# Patient Record
Sex: FEMALE | Race: WHITE | NOT HISPANIC OR LATINO | URBAN - METROPOLITAN AREA
[De-identification: names, ages, dates, MRNs, and addresses within clinical notes are randomized per-mention and may not be internally consistent; named-entity substitution may affect disease eponyms.]

---

## 2023-05-25 DIAGNOSIS — R19.7 DIARRHEA, UNSPECIFIED TYPE: Primary | ICD-10-CM

## 2023-06-02 ENCOUNTER — TELEMEDICINE (OUTPATIENT)
Dept: PRIMARY CARE | Facility: CLINIC | Age: 23
End: 2023-06-02
Payer: COMMERCIAL

## 2023-06-02 ENCOUNTER — APPOINTMENT (OUTPATIENT)
Dept: PRIMARY CARE | Facility: CLINIC | Age: 23
End: 2023-06-02
Payer: COMMERCIAL

## 2023-06-02 DIAGNOSIS — K58.2 IRRITABLE BOWEL SYNDROME WITH BOTH CONSTIPATION AND DIARRHEA: Primary | ICD-10-CM

## 2023-06-02 PROCEDURE — 99213 OFFICE O/P EST LOW 20 MIN: CPT | Performed by: FAMILY MEDICINE

## 2023-06-02 RX ORDER — TRIFAROTENE 50 UG/G
CREAM TOPICAL
COMMUNITY
Start: 2022-08-16 | End: 2023-12-04 | Stop reason: WASHOUT

## 2023-06-02 RX ORDER — TAZAROTENE 0.45 MG/G
LOTION TOPICAL
COMMUNITY
End: 2023-12-04 | Stop reason: WASHOUT

## 2023-06-02 RX ORDER — SULFAMETHOXAZOLE AND TRIMETHOPRIM 800; 160 MG/1; MG/1
1 TABLET ORAL 2 TIMES DAILY
COMMUNITY
Start: 2023-04-10

## 2023-06-02 NOTE — PROGRESS NOTES
Subjective   Patient ID: Marivel Alvarez is a 22 y.o. female who presents for Irritable Bowel Syndrome.    HPI  Marivel has been experiencing abdominal discomfort daily after eating or drinking.  She has not been able to determine a particular trigger to this point.  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    Sometimes the pain has been so intense in the epigastric region that she has not been able to eat.  Initially, she thought about lactose intolerance, but there is some times that she feels fine with eating ice cream, and then sometimes she has diarrhea.  Some of her bowel movements have been alternating between constipation and diarrhea.  Stool has been mucousy, but no black or blood noted in the stool or on the toilet paper with the exception of a time in which she was constipated, telling me that she thinks she may have had a hemorrhoid or at least a fissure, and it lasted for quite a while throughout a given day, but not to return.    In addition to the aforementioned, she had noticed a rash that started on her elbows, and a rash on the webbing's of her fingers, some vesicular, and since going gluten-free, the rash has started to dissipate on her elbow, and the rash is gone between her fingers.    Review of Systems  All pertinent positive symptoms are included in the history of present illness.    All other systems have been reviewed and are negative and noncontributory to this patient's current ailments.    Allergies   Allergen Reactions    Minocycline Headache       Immunization History   Administered Date(s) Administered    DTaP 1999, 01/21/2000, 03/17/2000, 01/08/2001, 06/01/2004, 05/27/2021    HPV, Quadrivalent 07/06/2011, 10/11/2011, 07/27/2012    Hep A, ped/adol, 2 dose 05/23/2017, 12/18/2017    Hep B, adult 01/21/2000, 03/17/2000,  09/18/2000    Hib (HbOC) 1999, 01/21/2000, 03/17/2000, 01/08/2001    IPV 1999, 01/21/2000, 03/21/2001, 06/01/2004    Influenza, injectable, MDCK, preservative free, quadrivalent 09/22/2022    Influenza, injectable, quadrivalent 12/18/2017    Influenza, injectable, quadrivalent, preservative free 10/01/2021    MMR 01/08/2001, 06/01/2004    Meningococcal B, Omv 12/18/2017, 03/15/2018    Meningococcal B, Unspecified 12/18/2017, 03/15/2018    Meningococcal MCV4P 07/06/2011, 07/05/2016    Moderna SARS-CoV-2 Vaccination 12/20/2021    Pfizer Purple Cap SARS-CoV-2 04/01/2021, 04/22/2021    Pneumococcal Conjugate PCV 7 01/08/2001, 03/21/2001    Tdap 07/06/2011, 05/27/2021    Varicella 09/18/2000, 07/15/2014       Objective   Visit Vitals  Smoking Status Never       Physical Exam  CONSTITUTIONAL - well nourished, well developed, looks like stated age, in no acute distress, not ill-appearing, and not tired appearing  SKIN - normal skin color and pigmentation; barely visible rash on the right elbow, no rash noted between the fingers  EYES - normal external exam  LUNGS - breathing comfortably, no dyspnea  EXTREMITIES - no deformities noticeable  NEUROLOGICAL - oriented and no focal signs  PSYCHIATRIC - alert, pleasant and cordial, age-appropriate, not anxious or depressed appearing    Assessment/Plan   Problem List Items Addressed This Visit       Irritable bowel syndrome with both constipation and diarrhea - Primary     Highly suspect IBS, the question that needs to be answered is what is triggering this?    I suspect you likely have celiac disease even though your celiac test that was recently done is negative as 10% of the population can be seronegative and still have celiac disease    I would recommend continuing gluten-free diet for at least another 5 days, and if your symptoms resolve after 10 days of being gluten-free, that is your answer    If there is no improvement, and you continue to have IBS symptoms  including mucousy stool, bowel cramping, bloating, then an upper and lower endoscopy are warranted, so please let me know and I can get you seen by one of the GI docs in our healthcare system

## 2023-06-02 NOTE — ASSESSMENT & PLAN NOTE
Highly suspect IBS, the question that needs to be answered is what is triggering this?    I suspect you likely have celiac disease even though your celiac test that was recently done is negative as 10% of the population can be seronegative and still have celiac disease    I would recommend continuing gluten-free diet for at least another 5 days, and if your symptoms resolve after 10 days of being gluten-free, that is your answer    If there is no improvement, and you continue to have IBS symptoms including mucousy stool, bowel cramping, bloating, then an upper and lower endoscopy are warranted, so please let me know and I can get you seen by one of the GI docs in our healthcare system   Yes

## 2023-12-04 ENCOUNTER — TELEMEDICINE (OUTPATIENT)
Dept: PRIMARY CARE | Facility: CLINIC | Age: 23
End: 2023-12-04
Payer: COMMERCIAL

## 2023-12-04 DIAGNOSIS — M25.561 LATERAL KNEE PAIN, RIGHT: Primary | ICD-10-CM

## 2023-12-04 PROCEDURE — 99213 OFFICE O/P EST LOW 20 MIN: CPT | Performed by: FAMILY MEDICINE

## 2023-12-04 NOTE — PROGRESS NOTES
Subjective   Patient ID: Marivel Alvarez is a 23 y.o. female who presents for Knee Pain.    Past Medical, Surgical, and Family History reviewed and updated in chart.    Reviewed all medications by prescribing practitioner or clinical pharmacist (such as prescriptions, OTCs, herbal therapies and supplements) and documented in the medical record.    HPI  Marivel presents virtually for right lateral knee pain.  She ran in a half marathon in April and did fine with this.  She took about 2 to 3 weeks off and slowly progressed back into running.  This was the point she first noticed right lateral knee pain.      Since then, she has been limited to only 1 to 2 miles of running per day before she starts to feel this pain.  It is not present while walking, only until she gets past a mile of running.  It has been so bad recently that she has had to limp back to her apartment after running.    Denies numbness, tingling or radiating pain.    Review of Systems  All pertinent positive symptoms are included in the history of present illness.    All other systems have been reviewed and are negative and noncontributory to this patient's current ailments.    Past Medical History:   Diagnosis Date    Personal history of other infectious and parasitic diseases     History of infection due to human papilloma virus (HPV)    Personal history of urinary (tract) infections     History of chronic urinary tract infection     Past Surgical History:   Procedure Laterality Date    OTHER SURGICAL HISTORY  03/11/2020    [place] tooth extraction     Social History     Tobacco Use    Smoking status: Never     Passive exposure: Never    Smokeless tobacco: Never   Substance Use Topics    Alcohol use: Yes    Drug use: Never     No family history on file.  Immunization History   Administered Date(s) Administered    DTaP vaccine, pediatric  (INFANRIX) 1999, 01/21/2000, 03/17/2000, 01/08/2001, 06/01/2004, 05/27/2021    Flu vaccine (IIV4), preservative  free *Check age/dose* 10/01/2021    HPV, Quadrivalent 07/06/2011, 10/11/2011, 07/27/2012    Hepatitis A vaccine, pediatric/adolescent (HAVRIX, VAQTA) 05/23/2017, 12/18/2017    Hepatitis B vaccine, adult (RECOMBIVAX, ENGERIX) 01/21/2000, 03/17/2000, 09/18/2000    Hib (HbOC) 1999, 01/21/2000, 03/17/2000, 01/08/2001    Influenza, injectable, MDCK, preservative free, quadrivalent 09/22/2022    Influenza, injectable, quadrivalent 12/18/2017    MMR vaccine, subcutaneous (MMR II) 01/08/2001, 06/01/2004    Meningococcal B vaccine (BEXSERO) 12/18/2017, 03/15/2018    Meningococcal B, Unspecified 12/18/2017, 03/15/2018    Meningococcal MCV4P 07/06/2011, 07/05/2016    Moderna SARS-CoV-2 Vaccination 12/20/2021    Pfizer Purple Cap SARS-CoV-2 04/01/2021, 04/22/2021    Pneumococcal Conjugate PCV 7 01/08/2001, 03/21/2001    Poliovirus vaccine, subcutaneous (IPOL) 1999, 01/21/2000, 03/21/2001, 06/01/2004    Tdap vaccine, age 7 year and older (BOOSTRIX) 07/06/2011, 05/27/2021    Varicella vaccine, subcutaneous (VARIVAX) 09/18/2000, 07/15/2014     Current Outpatient Medications   Medication Instructions    levonorgestreL (KYLEENA) 17.5 mcg/24 hrs (5 yrs) 19.5 mg intrauterine device 1 each, intrauterine    sulfamethoxazole-trimethoprim (Bactrim DS) 800-160 mg tablet 1 tablet, oral, 2 times daily     Allergies   Allergen Reactions    Minocycline Headache       Objective   There were no vitals filed for this visit.   BP Readings from Last 3 Encounters:   08/10/22 122/62   05/17/22 114/60   08/05/21 100/60      Wt Readings from Last 3 Encounters:   08/10/22 61.2 kg (135 lb)   05/17/22 57.2 kg (126 lb)   08/05/21 55.8 kg (123 lb)      Physical Exam  CONSTITUTIONAL - well nourished, well developed, looks like stated age, in no acute distress, not ill-appearing, and not tired appearing  SKIN - normal skin color and pigmentation, normal skin turgor without rash, lesions, or nodules visualized  HEAD - no trauma, normocephalic  EYES  - normal external exam  CHEST -no distressed breathing, good effort  EXTREMITIES - no edema, no deformities  NEUROLOGICAL - normal balance, normal motor, no ataxia  PSYCHIATRIC - alert, pleasant and cordial, age-appropriate     Assessment/Plan   Problem List Items Addressed This Visit       Lateral knee pain, right - Primary     Not being able to perform a physical exam today made it challenging to appropriately assess you.  I first and foremost want to rule out a stress fracture as this can be common with long distance runners.  Another diagnosis we did not talk much about today is called IT band syndrome.  The IT band attaches near the lateral knee and can be a common cause of pain with long distance runners as well.      Because you are currently in [place], I placed a physical therapy order for them to assess your running mechanics and address any possible IT band syndrome if present.  You may want to consider some home IT band stretches to see if this alleviates your pain prior to going to physical therapy.  We will contact you with results of your x-ray.    If there is no improvement after we get that x-ray/physical therapy accomplished, we may have to consider an MRI.         Relevant Orders    XR knee right 4+ views    Referral to Physical Therapy

## 2023-12-04 NOTE — ASSESSMENT & PLAN NOTE
Not being able to perform a physical exam today made it challenging to appropriately assess you.  I first and foremost want to rule out a stress fracture as this can be common with long distance runners.  Another diagnosis we did not talk much about today is called IT band syndrome.  The IT band attaches near the lateral knee and can be a common cause of pain with long distance runners as well.      Because you are currently in [place], I placed a physical therapy order for them to assess your running mechanics and address any possible IT band syndrome if present.  You may want to consider some home IT band stretches to see if this alleviates your pain prior to going to physical therapy.  We will contact you with results of your x-ray.    If there is no improvement after we get that x-ray/physical therapy accomplished, we may have to consider an MRI.

## 2024-03-07 DIAGNOSIS — Z11.1 TUBERCULOSIS SCREENING: Primary | ICD-10-CM

## 2024-03-07 DIAGNOSIS — Z02.1 DRUG SCREENING, PRE-EMPLOYMENT: ICD-10-CM

## 2024-03-08 DIAGNOSIS — Z02.1 DRUG SCREENING, PRE-EMPLOYMENT: Primary | ICD-10-CM

## 2024-03-29 ENCOUNTER — APPOINTMENT (OUTPATIENT)
Dept: PRIMARY CARE | Facility: CLINIC | Age: 24
End: 2024-03-29
Payer: COMMERCIAL

## 2024-04-21 DIAGNOSIS — Z11.1 SCREENING-PULMONARY TB: Primary | ICD-10-CM

## 2025-03-18 ASSESSMENT — ENCOUNTER SYMPTOMS
TINGLING: 0
MUSCLE WEAKNESS: 0
LOSS OF SENSATION: 0
LOSS OF MOTION: 0
INABILITY TO BEAR WEIGHT: 1

## 2025-03-19 ENCOUNTER — OFFICE VISIT (OUTPATIENT)
Dept: PRIMARY CARE | Facility: CLINIC | Age: 25
End: 2025-03-19
Payer: COMMERCIAL

## 2025-03-19 VITALS
WEIGHT: 127 LBS | DIASTOLIC BLOOD PRESSURE: 70 MMHG | HEART RATE: 68 BPM | HEIGHT: 65 IN | SYSTOLIC BLOOD PRESSURE: 118 MMHG | BODY MASS INDEX: 21.16 KG/M2

## 2025-03-19 DIAGNOSIS — G43.109 MIGRAINE WITH AURA AND WITHOUT STATUS MIGRAINOSUS, NOT INTRACTABLE: ICD-10-CM

## 2025-03-19 DIAGNOSIS — G89.29 CHRONIC PAIN OF RIGHT KNEE: ICD-10-CM

## 2025-03-19 DIAGNOSIS — Z79.899 ENCOUNTER FOR LONG-TERM (CURRENT) USE OF MEDICATIONS: Primary | ICD-10-CM

## 2025-03-19 DIAGNOSIS — M25.561 CHRONIC PAIN OF RIGHT KNEE: ICD-10-CM

## 2025-03-19 DIAGNOSIS — S83.511A NEW ACL TEAR, RIGHT, INITIAL ENCOUNTER: ICD-10-CM

## 2025-03-19 PROCEDURE — 1036F TOBACCO NON-USER: CPT | Performed by: FAMILY MEDICINE

## 2025-03-19 PROCEDURE — 3008F BODY MASS INDEX DOCD: CPT | Performed by: FAMILY MEDICINE

## 2025-03-19 PROCEDURE — 99214 OFFICE O/P EST MOD 30 MIN: CPT | Performed by: FAMILY MEDICINE

## 2025-03-19 RX ORDER — RIZATRIPTAN BENZOATE 10 MG/1
10 TABLET, ORALLY DISINTEGRATING ORAL ONCE AS NEEDED
Qty: 9 TABLET | Refills: 5 | Status: SHIPPED | OUTPATIENT
Start: 2025-03-19 | End: 2025-09-15

## 2025-03-19 ASSESSMENT — PATIENT HEALTH QUESTIONNAIRE - PHQ9
1. LITTLE INTEREST OR PLEASURE IN DOING THINGS: NOT AT ALL
SUM OF ALL RESPONSES TO PHQ9 QUESTIONS 1 AND 2: 0
2. FEELING DOWN, DEPRESSED OR HOPELESS: NOT AT ALL

## 2025-03-19 NOTE — ASSESSMENT & PLAN NOTE
MRI report reviewed  Please obtain copy of disc for orthopedic surgery  Consultation with orthopedic surgery to discuss probable need for surgical intervention especially since you continue to have long-term pain  I will reach out to Dr. 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

## 2025-03-19 NOTE — ASSESSMENT & PLAN NOTE
Since you have difficulty swallowing pills, I suggested the use of a triptan that disintegrates on the tongue for abortive therapy  Please use as instructed, namely at the onset of headache, and no more than 1 more tablet 2 hours later in a 24-hour period  If you continue to have higher frequency or worsening headache symptoms, we really need to consider a scan of the brain to further evaluate for potential underlying etiologies

## 2025-03-19 NOTE — Clinical Note
Goyo Millan.  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  She was hoping for a consultation within the next 3 weeks as she has a tear of her right ACL.  She will bring a copy of the disc as she had it at an outside facility, and I will scan in the report to the chart.  Any help would be greatly appreciated.

## 2025-03-19 NOTE — PROGRESS NOTES
Chief Complaint  Patient ID: Marivel Alvarez is a 24 y.o. female who presents for Knee Pain and Migraine.    Past Medical, Surgical, and Family History reviewed and updated in chart.    Reviewed all medications by prescribing practitioner or clinical pharmacist (such as prescriptions, OTCs, herbal therapies and supplements) and documented in the medical record.    History of Present Illness  1. Right knee pain.  Marivel was seen on December 4, 2023, for right knee pain via telemedicine. At that time, because we could not perform a physical examination, she was diagnosed with knee pain/strain. She was referred to physical therapy; however, due to the cost and her schedule as a medical student, she only attended four sessions. For the past eight months, she has been routinely performing home physical therapy exercises. She denies any known injury or trauma but recalls that after running marathons, she began experiencing pain. The therapy provided minimal benefit; she reports that she used to be able to run several miles, then three miles, then one mile, and now experiences intense pain following a one-mile run.    An MRI performed at an outside facility on March 14, 2025, revealed a right-sided interstitial ACL tear and mild nonspecific edema in the proximal medial and lateral heads of the gastrocnemius muscle, with no appreciable tear.    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 She recently learned that she has matched and will find out her placement in two days. She plans to get  and travel to [place] before starting her residency. She hopes to see an orthopedic surgeon within the next three weeks for a consultation to discuss potential surgical intervention, as the pain is interfering with her ability to exercise and perform physical activities without discomfort.    2. Migraine  headaches.  Marivel reports experiencing migraine headaches since childhood. Over the past several years, particularly since starting medical school, the frequency of her migraines has increased. She manages them with over-the-counter Excedrin and Tylenol, up to 3000 mg daily. She has never received a prescription medication for either abortive or preventative therapy. She experiences headache symptoms about 3-4 times per month, without any identifiable triggers, such as food, liquids, or her menstrual cycle.    3. Long-term use of medication.  She is currently taking oral contraceptive pills (OCP) and Bactrim to manage acne, which she finds to be very effective. She has been taking these medications for the past three years without any formal blood work but is interested in having this done.    Review of Systems  All pertinent positive symptoms are included in the history of present illness.    All other systems have been reviewed and are negative and noncontributory to this patient's current ailments.    Past Medical History  She has a past medical history of Personal history of other infectious and parasitic diseases and Personal history of urinary (tract) infections.    Surgical History  She has a past surgical history that includes Other surgical history (03/11/2020).     Social History  She reports that she has never smoked. She has never been exposed to tobacco smoke. She has never used smokeless tobacco. She reports current alcohol use. She reports that she does not use drugs.    No family history on file.  Outpatient Medications Prior to Visit   Medication Sig Dispense Refill    levonorgestreL (KYLEENA) 17.5 mcg/24 hrs (5 yrs) 19.5 mg intrauterine device 19.5 mg by intrauterine route.      sulfamethoxazole-trimethoprim (Bactrim DS) 800-160 mg tablet Take 1 tablet by mouth 2 times a day.       No facility-administered medications prior to visit.     Allergies  Minocycline    Immunization History   Administered  "Date(s) Administered    COVID-19, mRNA, LNP-S, PF, 30 mcg/0.3 mL dose 04/01/2021, 04/22/2021    DTaP vaccine, pediatric  (INFANRIX) 1999, 01/21/2000, 03/17/2000, 01/08/2001, 06/01/2004, 05/27/2021    Flu vaccine (IIV4), preservative free *Check age/dose* 10/01/2021    Flu vaccine, quadrivalent, no egg protein, age 6 month or greater (FLUCELVAX) 09/22/2022    Flu vaccine, trivalent, preservative free, age 6 months and greater (Fluarix/Fluzone/Flulaval) 10/09/2024    HPV, Quadrivalent 07/06/2011, 10/11/2011, 07/27/2012    Hepatitis A vaccine, pediatric/adolescent (HAVRIX, VAQTA) 05/23/2017, 12/18/2017    Hepatitis B vaccine, adult *Check Product/Dose* 01/21/2000, 03/17/2000, 09/18/2000    Hib (HbOC) 1999, 01/21/2000, 03/17/2000, 01/08/2001    Influenza, injectable, quadrivalent 12/18/2017    MMR vaccine, subcutaneous (MMR II) 01/08/2001, 06/01/2004    Meningococcal ACWY-D (Menactra) 4-valent conjugate vaccine 07/06/2011, 07/05/2016    Meningococcal B vaccine (BEXSERO) 12/18/2017, 03/15/2018    Meningococcal B, Unspecified 12/18/2017, 03/15/2018    Moderna SARS-CoV-2 Vaccination 12/20/2021    Pneumococcal Conjugate PCV 7 01/08/2001, 03/21/2001    Poliovirus vaccine, subcutaneous (IPOL) 1999, 01/21/2000, 03/21/2001, 06/01/2004    Tdap vaccine, age 7 year and older (BOOSTRIX, ADACEL) 07/06/2011, 05/27/2021    Varicella vaccine, subcutaneous (VARIVAX) 09/18/2000, 07/15/2014     Objective   Visit Vitals  /70   Pulse 68   Ht 1.651 m (5' 5\")   Wt 57.6 kg (127 lb)   BMI 21.13 kg/m²   Smoking Status Never   BSA 1.63 m²        BP Readings from Last 3 Encounters:   03/19/25 118/70   08/10/22 122/62   05/17/22 114/60      Wt Readings from Last 3 Encounters:   03/19/25 57.6 kg (127 lb)   08/10/22 61.2 kg (135 lb)   05/17/22 57.2 kg (126 lb)      Physical Exam  CONSTITUTIONAL - well nourished, well developed, looks like stated age, in no acute distress, not ill-appearing, and not tired appearing  SKIN - " normal skin color and pigmentation  EYES - normal external exam  LUNGS - breathing comfortably, no dyspnea  EXTREMITIES - no deformities noticeable; right knee without LCL or MCL tenderness, negative anterior and posterior drawer, no obvious swelling, reproducible tenderness  NEUROLOGICAL - oriented and no focal signs  PSYCHIATRIC - alert, pleasant and cordial, age-appropriate, not anxious or depressed appearing    Assessment and Plan  Problem List Items Addressed This Visit       Chronic pain of right knee     Supportive care recommended, continue PT exercises at home         Relevant Orders    Referral to Orthopedics and Sports Medicine    New ACL tear, right, initial encounter     MRI report reviewed  Please obtain copy of disc for orthopedic surgery  Consultation with orthopedic surgery to discuss probable need for surgical intervention especially since you continue to have long-term pain  I will reach out to Dr. 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         Relevant Orders    Referral to Orthopedics and Sports Medicine    Migraine with aura and without status migrainosus, not intractable     Since you have difficulty swallowing pills, I suggested the use of a triptan that disintegrates on the tongue for abortive therapy  Please use as instructed, namely at the onset of headache, and no more than 1 more tablet 2 hours later in a 24-hour period  If you continue to have higher frequency or worsening headache symptoms, we really need to consider a scan of the brain to further evaluate for potential underlying etiologies         Relevant Medications    rizatriptan MLT (Maxalt-MLT) 10 mg disintegrating tablet     Other Visit Diagnoses       Encounter for long-term (current) use of medications    -  Primary    Relevant Orders    Comprehensive Metabolic Panel    CBC and Auto Differential          Answers submitted by  the patient for this visit:  Lower Extremity Injury Questionnaire (Submitted on 3/18/2025)  Chief Complaint: Lower extremity pain  Incident occurred: more than 1 week ago  Pain location: right knee  Pain quality: aching, shooting  Pain - numeric: 3/10  Pain course: intermittent  tingling: No  inability to bear weight: Yes  loss of motion: No  loss of sensation: No  muscle weakness: No  Foreign body present: no foreign bodies

## 2025-03-20 ENCOUNTER — TELEPHONE (OUTPATIENT)
Dept: ORTHOPEDIC SURGERY | Facility: HOSPITAL | Age: 25
End: 2025-03-20
Payer: COMMERCIAL

## 2025-03-20 LAB
ALBUMIN SERPL-MCNC: 5 G/DL (ref 3.6–5.1)
ALP SERPL-CCNC: 36 U/L (ref 31–125)
ALT SERPL-CCNC: 13 U/L (ref 6–29)
ANION GAP SERPL CALCULATED.4IONS-SCNC: 10 MMOL/L (CALC) (ref 7–17)
AST SERPL-CCNC: 15 U/L (ref 10–30)
BASOPHILS # BLD AUTO: 20 CELLS/UL (ref 0–200)
BASOPHILS NFR BLD AUTO: 0.4 %
BILIRUB SERPL-MCNC: 0.5 MG/DL (ref 0.2–1.2)
BUN SERPL-MCNC: 14 MG/DL (ref 7–25)
CALCIUM SERPL-MCNC: 9.9 MG/DL (ref 8.6–10.2)
CHLORIDE SERPL-SCNC: 104 MMOL/L (ref 98–110)
CO2 SERPL-SCNC: 25 MMOL/L (ref 20–32)
CREAT SERPL-MCNC: 0.91 MG/DL (ref 0.5–0.96)
EGFRCR SERPLBLD CKD-EPI 2021: 90 ML/MIN/1.73M2
EOSINOPHIL # BLD AUTO: 61 CELLS/UL (ref 15–500)
EOSINOPHIL NFR BLD AUTO: 1.2 %
ERYTHROCYTE [DISTWIDTH] IN BLOOD BY AUTOMATED COUNT: 11.9 % (ref 11–15)
GLUCOSE SERPL-MCNC: 106 MG/DL (ref 65–99)
HCT VFR BLD AUTO: 37.7 % (ref 35–45)
HGB BLD-MCNC: 12.7 G/DL (ref 11.7–15.5)
LYMPHOCYTES # BLD AUTO: 2188 CELLS/UL (ref 850–3900)
LYMPHOCYTES NFR BLD AUTO: 42.9 %
MCH RBC QN AUTO: 30.5 PG (ref 27–33)
MCHC RBC AUTO-ENTMCNC: 33.7 G/DL (ref 32–36)
MCV RBC AUTO: 90.4 FL (ref 80–100)
MONOCYTES # BLD AUTO: 388 CELLS/UL (ref 200–950)
MONOCYTES NFR BLD AUTO: 7.6 %
NEUTROPHILS # BLD AUTO: 2443 CELLS/UL (ref 1500–7800)
NEUTROPHILS NFR BLD AUTO: 47.9 %
PLATELET # BLD AUTO: 279 THOUSAND/UL (ref 140–400)
PMV BLD REES-ECKER: 9.2 FL (ref 7.5–12.5)
POTASSIUM SERPL-SCNC: 4.3 MMOL/L (ref 3.5–5.3)
PROT SERPL-MCNC: 7.4 G/DL (ref 6.1–8.1)
RBC # BLD AUTO: 4.17 MILLION/UL (ref 3.8–5.1)
SODIUM SERPL-SCNC: 139 MMOL/L (ref 135–146)
WBC # BLD AUTO: 5.1 THOUSAND/UL (ref 3.8–10.8)

## 2025-03-20 NOTE — RESULT ENCOUNTER NOTE
Your blood work looks excellent including your kidney, liver, complete blood cell count so the use of those medications is not causing any problem N/A

## 2025-03-20 NOTE — TELEPHONE ENCOUNTER
Called patient per the request of Dr. Mcmillan to get her in for a visit. She is being referred by her PCP Dr. Fish for ACL tear. Spoke with patient and she was successfully scheduled for 3/26/25 at 10:50 am at [facility]. She has the MRI on disk and will bring. She had x-rays outside of the system and will see if she can get them as well. I let her know that if not we may need to get some additional images at the time of her visit. She understood. CT

## 2025-03-21 ENCOUNTER — APPOINTMENT (OUTPATIENT)
Dept: PRIMARY CARE | Facility: CLINIC | Age: 25
End: 2025-03-21
Payer: COMMERCIAL

## 2025-03-26 ENCOUNTER — OFFICE VISIT (OUTPATIENT)
Dept: ORTHOPEDIC SURGERY | Facility: HOSPITAL | Age: 25
End: 2025-03-26
Payer: COMMERCIAL

## 2025-03-26 ENCOUNTER — HOSPITAL ENCOUNTER (OUTPATIENT)
Dept: RADIOLOGY | Facility: HOSPITAL | Age: 25
Discharge: HOME | End: 2025-03-26
Payer: COMMERCIAL

## 2025-03-26 DIAGNOSIS — M76.31 IT BAND SYNDROME, RIGHT: Primary | ICD-10-CM

## 2025-03-26 DIAGNOSIS — M25.561 RIGHT KNEE PAIN, UNSPECIFIED CHRONICITY: ICD-10-CM

## 2025-03-26 PROCEDURE — 99213 OFFICE O/P EST LOW 20 MIN: CPT | Performed by: ORTHOPAEDIC SURGERY

## 2025-03-26 PROCEDURE — 73564 X-RAY EXAM KNEE 4 OR MORE: CPT | Mod: RIGHT SIDE | Performed by: RADIOLOGY

## 2025-03-26 PROCEDURE — 73564 X-RAY EXAM KNEE 4 OR MORE: CPT | Mod: RT

## 2025-03-26 PROCEDURE — 99203 OFFICE O/P NEW LOW 30 MIN: CPT | Performed by: ORTHOPAEDIC SURGERY

## 2025-03-30 NOTE — PROGRESS NOTES
HPI  This is a pleasant 24 y.o. female here today for right knee pain.  Patient states that they began having some pain along the lateral side of the knee without trauma.  They are runner and they began having some discomfort with running especially as they would get a couple of miles into the run.  They did some physical therapy though was not focused specifically on anything other than some core strengthening        Past Medical History:   Diagnosis Date    Personal history of other infectious and parasitic diseases     History of infection due to human papilloma virus (HPV)    Personal history of urinary (tract) infections     History of chronic urinary tract infection       Past Surgical History:   Procedure Laterality Date    OTHER SURGICAL HISTORY  03/11/2020    [place] tooth extraction       Social History     Tobacco Use    Smoking status: Never     Passive exposure: Never    Smokeless tobacco: Never   Substance Use Topics    Alcohol use: Yes    Drug use: Never           ROS  Reviewed and all pertinent positives mentioned in HPI.      EXAM  Patient is in no acute distress.  They are alert and oriented x3.  They are of normal mood and affect.  They are in no acute distress.  The patient's limb is warm and well-perfused.  They have intact sensation to light touch in all lower extremity dermatomes.  There is a minimal effusion.    The patient's quadriceps and hamstring strength is 5 of 5.    The patient can do a straight leg raise with no radicular pain.  negative lachmans. negative posterior drawer.  There is no patellofemoral crepitus.   The knee is stable to varus and valgus stress at both 0 and 30°.  There is no tenderness along the medial or lateral joint line.  negative McMurrays    The patient does have pain along the iliotibial band consistent with the location of their discomfort      IMAGING  Imaging reviewed today reveal no gross fracture or dislocation.  Preserved joint spaces.  MRI reviewed reveals   is essentially a negative examination for any structural damage of the knee on MRI      ASSESSMENT/PLAN  Patient with right  knee iliotibial band syndrome    We will have the patient initiate a course of physical therapy and continue NSAIDs and activity modification.  If symptoms persist, we will see them back for re-evaluation.          Donaldo Mcmillan MD

## 2025-04-07 ENCOUNTER — APPOINTMENT (OUTPATIENT)
Dept: PRIMARY CARE | Facility: CLINIC | Age: 25
End: 2025-04-07
Payer: COMMERCIAL

## 2025-04-08 ENCOUNTER — APPOINTMENT (OUTPATIENT)
Dept: PRIMARY CARE | Facility: CLINIC | Age: 25
End: 2025-04-08
Payer: COMMERCIAL

## 2025-04-08 ENCOUNTER — TELEPHONE (OUTPATIENT)
Dept: OBSTETRICS AND GYNECOLOGY | Facility: CLINIC | Age: 25
End: 2025-04-08

## 2025-04-15 ENCOUNTER — EVALUATION (OUTPATIENT)
Dept: PHYSICAL THERAPY | Facility: HOSPITAL | Age: 25
End: 2025-04-15
Payer: COMMERCIAL

## 2025-04-15 DIAGNOSIS — M76.31 IT BAND SYNDROME, RIGHT: ICD-10-CM

## 2025-04-15 DIAGNOSIS — M62.81 WEAKNESS OF RIGHT QUADRICEPS MUSCLE: ICD-10-CM

## 2025-04-15 DIAGNOSIS — R29.898 WEAKNESS OF BOTH HIPS: Primary | ICD-10-CM

## 2025-04-15 PROCEDURE — 97110 THERAPEUTIC EXERCISES: CPT | Mod: GP

## 2025-04-15 PROCEDURE — 97161 PT EVAL LOW COMPLEX 20 MIN: CPT | Mod: GP

## 2025-04-15 NOTE — PROGRESS NOTES
Physical Therapy  Physical Therapy Orthopedic Evaluation    Patient Name: Marivel Alvarez  MRN: [id number]  Today's Date: 4/15/2025  Time Calculation  Start Time: 0805  Stop Time: 0855  Time Calculation (min): 50 min    Therapy Diagnoses:    Problem List Items Addressed This Visit    None  Visit Diagnoses         Codes    Weakness of both hips    -  Primary R29.898    Relevant Orders    Follow Up In Physical Therapy    It band syndrome, right     M76.31    Relevant Orders    Follow Up In Physical Therapy    Weakness of right quadriceps muscle     M62.81    Relevant Orders    Follow Up In Physical Therapy            Visit #: 1     Insurance:   Payor: BARBARA  Authorization required: yes    General:  Reason for visit: Initial Evaluation of R knee   Referred by: Donaldo Mcmillan MD  Sport: Distance runner    Precautions: None     Medical History Form: Reviewed (scanned into chart)    Subjective:    CC: Patient arrives with complaint of R knee pain with running and hiking; reports LBP began shortly after knee pain  DOI: 2022  PRINCESS: Gradual worsening following running first half marathon  IMAGING: MRI available - unremarkable  HX: 2022 half marathon, gradual decrease due to pain  PAIN: Current: 0/10; Worst: 6/10; Location: lateral R knee; Description: sharp with running  SYMPTOMS: pain, weakness   ALLEVIATES PAIN: rest, ice, and OTC medication  EXACERBATES PAIN: running and hiking  over 1 mile  PLOF: Patient reports no functional limitations prior to injury.   PREVIOUS TREATMENTS: Physical Therapy - January 2024 (3 visits)  LIVING ENVIRONMENT: Reviewed. No concerns.   WORK: [facility] starts in July  EXERCISE: Currently unable to run for exercise  PATIENT'S GOAL: reduce pain, improve strength, and return to running for exercise/hobby    Objective:   Other Measures  Lower Extremity Funtional Score (LEFS): 67    SENSATION: Patient denies n/t of any kind.    GAIT: Patient ambulates WNL     SWELLING (Stroke  "Test): 0 - no fluid-wave while performing a downward stroke  ERYTHEMA R/L: no  ECCHYMOSIS R/L: no    PROM: (supine)   Knee R/L: ( 3 - 0 - 145) / (3 - 0 - 150) *without pain at end range extension and flexion noted  Patella Mobility: WNL  Hip: All hip motions WNL and pain free  Ankle: All ankle motions WNL and pain free      FLEXIBILITY  Magdy R/L: WNL / WNL  Hamstrings (90/90) R/L: WNL / WNL  Quads (PKB) R/L: min limit / WNL    MMT (out of 5)   Hip Flex R/L: 4 / 4  Hip Abd R/L: 3+ / 4  Hip Ext R/L:  4+ / 4+  Knee Ext  R/L:  4 / 5  Knee Flex R/L: 4 / 5    SPECIAL TESTS:  Jaye: negative  Anterior Drawer: negative  Posterior Drawer: negative  Varus Stress: negative  Valgus Stress: negative  Dial Test: hypermobility present bilaterally without pain provocation  Noble: Negative    FUNCTION:   Lateral Heel Tap (6\"): demoes severe loss of hip control in frontal plane and knee valgus  Running: demoes min scissoring gait when jogging      LUMBAR SCREEN     REPEATED MOVEMENTS:   Standing Ext: No change  Standing Flex: No change    SEGMENTAL MOBILITY:   TS: WNL  LS: WNL  *no provocation of pain    Palpation: Denies pain with palpation of knee and lumbar region      Treatment:   Initial evaluation - Low complexity (25 minutes)   Patient educated on POC, HEP, and current condition.   Therapeutic exercise performed (25 minutes)  HEP Provided (See Below)   Manual therapy performed (  minutes)  none  Neuromuscular re-education performed (  minutes)  none  Modality provided (  minutes)  none    Access Code: [email or web address]  URL: [email or web address]  Date: 04/15/2025  Prepared by: Ok Orosco    Exercises  - Side Stepping with Resistance at Ankles  - 1 x daily - 2-3 sets - 60\" hold  - Squatting Anti-Rotation Press  - 1 x daily - 2-3 sets - 12 reps  - Modified Side Plank with Hip Abduction  - 1 x daily - 2-3 sets - 30\" hold  - Lateral Heel Tap  - 1 x daily - 2-3 sets - 12 reps     Assessment:   Marivel Alvarez is " a 24 y.o. year old female runner who participated in a physical therapy evaluation today due to complaint of lateral R knee pain. Patient reports pain began in 2022. After running her first half marathon, pain gradually worsened, severely limiting her training. Today, their impairments include Pain, Active ROM, Passive ROM, Strength, Balance, Activity limitations, Motor function/control, Decreased functional level, Aerobic capacity/endurance, and Participation restrictions. Due to these impairments the patient is unable to perform regular exercise routine and hobbies without severe limitations. The patient's findings are consistent with IT band syndrome due to muscle weakness and improper running mechanics. Marivel will benefit from continued skilled physical therapy as well as development of a home exercise program to address current impairments and progress towards return to their prior level of function without limitations.    Rehab Potential: Good    Clinical Presentation:  Stable and/or uncomplicated characteristics    Level of Complexity: Low    Goals:  - LEFS: 75/80 to indicate a significant improvement in overall function.    - Patient will demo 5/5 LE strength (all joints/planes) to allow for correct gait and stair mechanics   - Patient will demo no limitation of bilateral knee AROM for improved functional ability   - Patient will  demo good neuromuscular control with eccentric loading and will perform step down test from 8 inch box without dynamic knee valgus   - Patient will demo independence with squatting, hinging, and lunging activities in order to safely strength train outside of clinic.   - Patient will perform sport specific plyometric and agility activities with good LE biomechanics to avoid future irritation  - Patient will demo involved lower extremity strength greater than or equal to 90% of the contralateral lower extremity with HHD  - Patient will pass all directions of Y balance test with >90%  performance to contralateral LE.      Plan:     Recommended Treatment:  Therapeutic exercise, Manual therapy, Gait training, Home program instruction and progression, Neuromuscular re-education, Therapeutic activities, Self care and home management, Instruction in activity modification, Electrical stimulation, Vasopneumatic device + cold, and Dry Needling    Recommended Visits: 1-2 visits x 6-8 weeks     Patient in agreement with POC.      Ok Orosco, PT

## 2025-04-22 ENCOUNTER — TREATMENT (OUTPATIENT)
Dept: PHYSICAL THERAPY | Facility: HOSPITAL | Age: 25
End: 2025-04-22
Payer: COMMERCIAL

## 2025-04-22 DIAGNOSIS — R29.898 WEAKNESS OF BOTH HIPS: ICD-10-CM

## 2025-04-22 DIAGNOSIS — M76.31 IT BAND SYNDROME, RIGHT: ICD-10-CM

## 2025-04-22 DIAGNOSIS — M62.81 WEAKNESS OF RIGHT QUADRICEPS MUSCLE: ICD-10-CM

## 2025-04-22 PROCEDURE — 97110 THERAPEUTIC EXERCISES: CPT | Mod: GP

## 2025-04-22 ASSESSMENT — PAIN SCALES - GENERAL: PAINLEVEL_OUTOF10: 0 - NO PAIN

## 2025-04-22 ASSESSMENT — PAIN - FUNCTIONAL ASSESSMENT: PAIN_FUNCTIONAL_ASSESSMENT: 0-10

## 2025-04-22 NOTE — PROGRESS NOTES
"    Physical Therapy  Physical Therapy Treatment Note    Patient Name: Marivel Alvarez  MRN: [id number]  Today's Date: 4/22/2025  Time Calculation  Start Time: 0105  Stop Time: 0155  Time Calculation (min): 50 min    Current Problem  Problem List Items Addressed This Visit    None  Visit Diagnoses         Codes      It band syndrome, right     M76.31      Weakness of both hips     R29.898      Weakness of right quadriceps muscle     M62.81            Visit #  2 of 5    Insurance:   Payor: BARBARA  Authorization required: yes  5 visits 4/15/25 - 6/13/25   cpt codes 89546, 15200, 65842, 74066     General:  Reason for visit: Initial Evaluation of R knee   Referred by: Donaldo Mcmillan MD  Sport: Distance runner    Precautions: None   Subjective  General: Patient presented to PT reporting no significant changes. States that she was unable to perform HEP as often as she would like due to honey tapia. Has been pain free since she has not tested knee either.     Pain:   Pain Assessment: 0-10  0-10 (Numeric) Pain Score: 0 - No pain    HEP Update:   Compliant: yes      Objective  NA     Treatment    Therapeutic Exercise:     50 minutes  Access Code: [email or web address]   Bike x 5'  Side stepping with red RB 2 x 10 yd R/L  Diagonal stepping with red RB 2 x 10 yd F/B  RFE clamshell with red RB 2 x 10 R/L ea.   Anterior heel tap 6\" box 2 x 12  Lateral heel tap 6\" box 2 x 12   Matrix seated HS curl SL 40# 3 x 10  Matrix Knee ext SL 55# 4 x 8   Side plank + hip ABD 3 x 45\" R/L   Reviewed proper strengthening routine in gym including reps, sets, rest, and frequency.     Therapeutic Activity       minutes  none    Manual Therapy:        minutes  none    Neuromuscular Re-education:     minutes  none    Modalities:       minutes  none        Assessment    Today's session was focused on strength. Patient was progressed through increased resistance with familiar exercises and instruction of multiple hip focused exercises . She required min verbal and " tactile cueing to complete today's progression with proper form. Session was tolerated well without any increase in pain or irritation over baseline. Marivel is progressing towards her goals as evidenced by improvement in strength and understanding. At this time, the patient would continue to benefit from skilled PT to address remaining functional, objective and subjective deficits to allow them to return to their prior level of function.    Plan  Continue strength training as tolerated.   Continue SL stability and core focused exercises.      Ok Orosco, PT

## 2025-04-29 ENCOUNTER — TREATMENT (OUTPATIENT)
Dept: PHYSICAL THERAPY | Facility: HOSPITAL | Age: 25
End: 2025-04-29
Payer: COMMERCIAL

## 2025-04-29 DIAGNOSIS — M62.81 WEAKNESS OF RIGHT QUADRICEPS MUSCLE: ICD-10-CM

## 2025-04-29 DIAGNOSIS — M76.31 IT BAND SYNDROME, RIGHT: ICD-10-CM

## 2025-04-29 DIAGNOSIS — R29.898 WEAKNESS OF BOTH HIPS: ICD-10-CM

## 2025-04-29 PROCEDURE — 97530 THERAPEUTIC ACTIVITIES: CPT | Mod: GP

## 2025-04-29 PROCEDURE — 97110 THERAPEUTIC EXERCISES: CPT | Mod: GP

## 2025-04-29 ASSESSMENT — PAIN SCALES - GENERAL: PAINLEVEL_OUTOF10: 0 - NO PAIN

## 2025-04-29 ASSESSMENT — PAIN - FUNCTIONAL ASSESSMENT: PAIN_FUNCTIONAL_ASSESSMENT: 0-10

## 2025-04-29 NOTE — PROGRESS NOTES
"    Physical Therapy  Physical Therapy Treatment Note    Patient Name: Marivel Alvarez  MRN: [id number]  Today's Date: 4/29/2025  Time Calculation  Start Time: 0805  Stop Time: 0915  Time Calculation (min): 70 min    Current Problem  Problem List Items Addressed This Visit    None  Visit Diagnoses         Codes      It band syndrome, right     M76.31      Weakness of both hips     R29.898      Weakness of right quadriceps muscle     M62.81              Visit #  3 of 5    Insurance:   Payor: BARBARA  Authorization required: yes  5 visits 4/15/25 - 6/13/25   cpt codes 67615, 29928, 12103, 66654     General:  Reason for visit: Initial Evaluation of R knee   Referred by: Donaldo Mcmillan MD  Sport: Distance runner    Precautions: None   Subjective  General: Patient presented to PT reporting no significant changes. States that she experiences some lateral knee \"aching\" when performing knee extensions, but ache does not linger following cessation of the activity.     Pain:   Pain Assessment: 0-10  0-10 (Numeric) Pain Score: 0 - No pain    HEP Update:   Compliant: yes      Objective  NA     Treatment    Therapeutic Exercise:     50 minutes  Access Code: [email or web address]   Bike x 5'  Side stepping with red RB 2 x 10 yd R/L  Diagonal stepping with red RB 2 x 10 yd F/B  RFE clamshell with red RB 2 x 10 R/L ea.   Foam roll/self TP release with LAX ball x 5 min *focus on TFL  Lateral heel tap 6\" rogue box 3 x 12   SL RDL 13# 3 x 12  Reviewed LAQ due to patient discomfort. No pain provoked today.   Reviewed proper strengthening routine in gym including reps, sets, rest, and frequency.     not performed this date:    Matrix seated HS curl SL 40# 3 x 10  Matrix Knee ext SL 55# 4 x 8   Side plank + hip ABD 3 x 45\" R/L     Therapeutic Activity     15 minutes  Alter G Treadmill: 70% BW; Incline 3%; Speed 3.0-6.0; Work:Rest 1:1; x 15 min    Manual Therapy:      5 minutes  STM and TP release TFL R    Neuromuscular Re-education:     " minutes  none    Modalities:       minutes  none        Assessment    Today's session was focused on strength and return to jogging . Patient was progressed through increased resistance with familiar exercises and initiation of alter g treadmill running at 70% BW . She required min verbal and tactile cueing to complete today's progression with proper form. Session was tolerated well without any increase in pain or irritation over baseline. Marivel is progressing towards her goals as evidenced by improvement in strength and understanding. At this time, the patient would continue to benefit from skilled PT to address remaining functional, objective and subjective deficits to allow them to return to their prior level of function.    Plan  Continue strength training as tolerated.   Continue SL stability and core focused exercises.      Ok Orosco, PT

## 2025-05-06 ENCOUNTER — TREATMENT (OUTPATIENT)
Dept: PHYSICAL THERAPY | Facility: HOSPITAL | Age: 25
End: 2025-05-06
Payer: COMMERCIAL

## 2025-05-06 DIAGNOSIS — R29.898 WEAKNESS OF BOTH HIPS: ICD-10-CM

## 2025-05-06 DIAGNOSIS — M76.31 IT BAND SYNDROME, RIGHT: ICD-10-CM

## 2025-05-06 DIAGNOSIS — M62.81 WEAKNESS OF RIGHT QUADRICEPS MUSCLE: ICD-10-CM

## 2025-05-06 PROCEDURE — 97110 THERAPEUTIC EXERCISES: CPT | Mod: GP

## 2025-05-06 PROCEDURE — 97530 THERAPEUTIC ACTIVITIES: CPT | Mod: GP

## 2025-05-06 ASSESSMENT — PAIN - FUNCTIONAL ASSESSMENT: PAIN_FUNCTIONAL_ASSESSMENT: 0-10

## 2025-05-06 ASSESSMENT — PAIN SCALES - GENERAL: PAINLEVEL_OUTOF10: 0 - NO PAIN

## 2025-05-06 NOTE — PROGRESS NOTES
"    Physical Therapy  Physical Therapy Treatment Note    Patient Name: Marivel Alvarez  MRN: [id number]  Today's Date: 5/6/2025  Time Calculation  Start Time: 1000  Stop Time: 1102  Time Calculation (min): 62 min    Current Problem  Problem List Items Addressed This Visit    None  Visit Diagnoses         Codes      It band syndrome, right     M76.31      Weakness of both hips     R29.898      Weakness of right quadriceps muscle     M62.81            Visit #  4 of 5    Insurance:   Payor: BARBARA  Authorization required: yes  5 visits 4/15/25 - 6/13/25   cpt codes 15160, 54883, 82502, 25800     General:  Reason for visit: Initial Evaluation of R knee   Referred by: Donaldo Mcmillan MD  Sport: Distance runner    Precautions: None   Subjective  General: Patient presented to PT reporting no significant changes. Has been pain free since last visit. No issues following alter G running.     Pain:   Pain Assessment: 0-10  0-10 (Numeric) Pain Score: 0 - No pain    HEP Update:   Compliant: yes      Objective  NA     Treatment    Therapeutic Exercise:     50 minutes  Access Code: [email or web address]   Bike x 5'  Side stepping with red RB 2 x 10 yd R/L  Diagonal stepping with red RB 2 x 10 yd F/B  RFE clamshell with red RB 2 x 10 R/L ea.   Crossover step up + high knee 18# KB 4 x 8    SL RDL 13# 3 x 12  KB swings 13# all direction 2 x 10 ea.   Reviewed proper strengthening routine in gym including reps, sets, rest, and frequency.     not performed this date:    Matrix seated HS curl SL 40# 3 x 10  Matrix Knee ext SL 55# 4 x 8   Side plank + hip ABD 3 x 45\" R/L   Lateral heel tap 6\" rogue box 3 x 12     Therapeutic Activity     12 minutes  Alter G Treadmill: 85% BW; Incline 2%; Speed 3.0-6.0; Work:Rest 1:1; x 15 min    Manual Therapy:        minutes  none    Neuromuscular Re-education:     minutes  none    Modalities:       minutes  none        Assessment    Patient presented with improved soft tissue mobility and no muscle restriction within " TFL today. Today's session was focused on strength and jogging progression. Patient was progressed through increased resistance with familiar exercises and initiation of alter g treadmill running at 85% BW. She required min verbal and tactile cueing to complete today's progression with proper form. Session was tolerated well without any increase in pain or irritation over baseline. Marivel is progressing towards her goals as evidenced by improvement in strength and understanding. At this time, the patient would continue to benefit from skilled PT to address remaining functional, objective and subjective deficits to allow them to return to their prior level of function.    Plan  Continue strength training as tolerated.   Continue SL stability and core focused exercises.   Patient to begin walk-jog program at home. Was provided print out.      Ok Orosco, PT

## 2025-05-07 ENCOUNTER — APPOINTMENT (OUTPATIENT)
Dept: OBSTETRICS AND GYNECOLOGY | Facility: CLINIC | Age: 25
End: 2025-05-07
Payer: COMMERCIAL

## 2025-05-07 VITALS
HEIGHT: 65 IN | DIASTOLIC BLOOD PRESSURE: 64 MMHG | WEIGHT: 132 LBS | SYSTOLIC BLOOD PRESSURE: 110 MMHG | BODY MASS INDEX: 21.99 KG/M2

## 2025-05-07 DIAGNOSIS — Z01.419 WELL WOMAN EXAM: Primary | ICD-10-CM

## 2025-05-07 DIAGNOSIS — Z30.431 IUD CHECK UP: ICD-10-CM

## 2025-05-07 DIAGNOSIS — Z12.4 CERVICAL CANCER SCREENING: ICD-10-CM

## 2025-05-07 DIAGNOSIS — Z11.3 SCREENING EXAMINATION FOR STD (SEXUALLY TRANSMITTED DISEASE): ICD-10-CM

## 2025-05-07 PROBLEM — Z86.19 HISTORY OF INFECTION DUE TO HUMAN PAPILLOMA VIRUS (HPV): Status: RESOLVED | Noted: 2025-05-07 | Resolved: 2025-05-07

## 2025-05-07 PROBLEM — R87.810 HIGH-RISK HUMAN PAPILLOMAVIRUS (HPV) DNA DETECTED IN CERVICAL SPECIMEN: Status: RESOLVED | Noted: 2025-05-07 | Resolved: 2025-05-07

## 2025-05-07 PROBLEM — N63.0 MASS OF BREAST: Status: ACTIVE | Noted: 2025-05-07

## 2025-05-07 PROCEDURE — 1036F TOBACCO NON-USER: CPT | Performed by: OBSTETRICS & GYNECOLOGY

## 2025-05-07 PROCEDURE — 3008F BODY MASS INDEX DOCD: CPT | Performed by: OBSTETRICS & GYNECOLOGY

## 2025-05-07 PROCEDURE — 88175 CYTOPATH C/V AUTO FLUID REDO: CPT

## 2025-05-07 PROCEDURE — 87491 CHLMYD TRACH DNA AMP PROBE: CPT

## 2025-05-07 PROCEDURE — 87591 N.GONORRHOEAE DNA AMP PROB: CPT

## 2025-05-07 PROCEDURE — 99395 PREV VISIT EST AGE 18-39: CPT | Performed by: OBSTETRICS & GYNECOLOGY

## 2025-05-07 ASSESSMENT — ENCOUNTER SYMPTOMS
CARDIOVASCULAR NEGATIVE: 1
MUSCULOSKELETAL NEGATIVE: 1
NEUROLOGICAL NEGATIVE: 1
PSYCHIATRIC NEGATIVE: 1
GASTROINTESTINAL NEGATIVE: 1
CONSTITUTIONAL NEGATIVE: 1
RESPIRATORY NEGATIVE: 1

## 2025-05-07 NOTE — PROGRESS NOTES
"Subjective   Marivel Alvarez is a 24 y.o. female who is here for a routine exam. Patient is amenorrheic on Kyleena IUD. Cyclic symptoms include breast tenderness and moodiness. No intermenstrual bleeding, spotting, or discharge. Denies dyspareunia    Current contraception: IUD  History of abnormal Pap smear: no  Family history of uterine or ovarian cancer: no  Regular self breast exam: yes  History of abnormal mammogram: no  Family history of breast cancer: no  History of abnormal lipids: no  Menstrual History:  OB History          0    Para   0    Term   0       0    AB   0    Living   0         SAB   0    IAB   0    Ectopic   0    Multiple   0    Live Births   0                  No LMP recorded. (Menstrual status: IUD).         Review of Systems   Constitutional: Negative.    Respiratory: Negative.     Cardiovascular: Negative.    Gastrointestinal: Negative.    Genitourinary: Negative.    Musculoskeletal: Negative.    Neurological: Negative.    Psychiatric/Behavioral: Negative.         Objective   /64   Ht 1.645 m (5' 4.75\")   Wt 59.9 kg (132 lb)   BMI 22.14 kg/m²     General:   alert, appears stated age, and cooperative   Heart: regular rate and rhythm, S1, S2 normal, no murmur, click, rub or gallop   Lungs: clear to auscultation bilaterally   Abdomen: soft, non-tender, without masses or organomegaly   Pelvic: Vulva normal in appearance without discoloration, rashes, lesions. Urethral meatus normal in appearance, without masses. Vagina is negative for blood, discharge, lesions. Uterus is small, mobile, non tender. There is no cervical motion tenderness, adnexal masses/tenderness. Perineum and anus with normal architecture and without rashes, lesions, discoloration.     Breast: No masses, skin changes, discoloration, tenderness, or nipple drainage bilaterally     Neck: Normal ROM. Thyroid normal size. No masses/tenderness     Lymph: No LAD   Psych: Normal mood/affect     Assessment/Plan "   Problem List Items Addressed This Visit    None  Visit Diagnoses         Well woman exam    -  Primary      Cervical cancer screening        Relevant Orders    THINPREP PAP      Screening examination for STD (sexually transmitted disease)        Relevant Orders    THINPREP PAP      IUD check up              1. Pelvic/breast exam wnl, counseled on breast awareness/self exam  2. Pap pending.  3. IUD in place without complication  4. Counseled on safe sex practices including condom use. Offered screening for GN/CT, HIV, Syphilis. Patient consents to GC    RTO 1 year  Miriam Gutierrez DO

## 2025-05-07 NOTE — PATIENT INSTRUCTIONS
Routine Gynecologic Visit:  You were seen today for a routine gyn visit with normal findings on exam  You were due for a pap smear today. You should still continue to get annual breast and pelvic exams in the office.  Continue self breast exams/monitoring at home and call for appointment in the office if there are ever masses, skin discoloration, dimpling/puckering of the skin, or nipple drainage when you are not pregnant  Your IUD was in place without issue. Continue to use condoms with any new partners to protect against STD's and have routine STD screening done at your gynecologic visits if you have had a new partner in the last year or have new symptoms of pelvic pain, discharge, vulvar rashes. STD screening was done today, you will receive results by [email or web address]  If you are having any gynecologic issues in the next year you should call the office. [phone] ([place]) or [phone] ([place])

## 2025-05-09 LAB
C TRACH RRNA SPEC QL NAA+PROBE: NEGATIVE
N GONORRHOEA DNA SPEC QL PROBE+SIG AMP: NEGATIVE

## 2025-05-13 ENCOUNTER — TELEPHONE (OUTPATIENT)
Dept: OBSTETRICS AND GYNECOLOGY | Facility: CLINIC | Age: 25
End: 2025-05-13
Payer: COMMERCIAL

## 2025-05-13 ENCOUNTER — TREATMENT (OUTPATIENT)
Dept: PHYSICAL THERAPY | Facility: HOSPITAL | Age: 25
End: 2025-05-13
Payer: COMMERCIAL

## 2025-05-13 DIAGNOSIS — M76.31 IT BAND SYNDROME, RIGHT: ICD-10-CM

## 2025-05-13 DIAGNOSIS — R29.898 WEAKNESS OF BOTH HIPS: ICD-10-CM

## 2025-05-13 DIAGNOSIS — M62.81 WEAKNESS OF RIGHT QUADRICEPS MUSCLE: ICD-10-CM

## 2025-05-13 PROCEDURE — 97110 THERAPEUTIC EXERCISES: CPT | Mod: GP

## 2025-05-13 ASSESSMENT — PAIN - FUNCTIONAL ASSESSMENT: PAIN_FUNCTIONAL_ASSESSMENT: 0-10

## 2025-05-13 ASSESSMENT — PAIN SCALES - GENERAL: PAINLEVEL_OUTOF10: 0 - NO PAIN

## 2025-05-13 NOTE — PROGRESS NOTES
"    Physical Therapy  Physical Therapy Treatment/Discharge Note    Patient Name: Marivel Alvarez  MRN: [id number]  Today's Date: 5/13/2025  Time Calculation  Start Time: 1000  Stop Time: 1055  Time Calculation (min): 55 min    Current Problem  Problem List Items Addressed This Visit    None  Visit Diagnoses         Codes      It band syndrome, right     M76.31      Weakness of both hips     R29.898      Weakness of right quadriceps muscle     M62.81              Visit #  5 of 5    Insurance:   Payor: BARBARA  Authorization required: yes  5 visits 4/15/25 - 6/13/25   cpt codes 61156, 15445, 45399, 65615     General:  Reason for visit: Initial Evaluation of R knee   Referred by: Donaldo Mcmillan MD  Sport: Distance runner    Precautions: None   Subjective  General: Patient presented to PT reporting walk-jog program going well without issue. HEP going well. Denies pain of any kind. Today will be patient's last day as [place]     Pain:   Pain Assessment: 0-10  0-10 (Numeric) Pain Score: 0 - No pain    HEP Update:   Compliant: yes      Objective  LEFS: 75    PROM: (supine)   Knee R/L: ( 3 - 0 - 145) / (3 - 0 - 150) *without pain at end range extension and flexion noted  Patella Mobility: WNL  Hip: All hip motions WNL and pain free  Ankle: All ankle motions WNL and pain free    FLEXIBILITY  Magdy R/L: WNL / WNL  Hamstrings (90/90) R/L: WNL / WNL  Quads (PKB) R/L: min limit / WNL    MMT (out of 5)   Hip Flex R/L: 4+ / 4+  Hip Abd R/L: 4+ / 4+  Hip Ext R/L:  4+ / 4+  Knee Ext  R/L:  5 / 5  Knee Flex R/L: 5 / 5    FUNCTION:   Step down test: demonstrates good control without dynamic knee valgus from 8\" box     Treatment    Therapeutic Exercise:     55 minutes  Access Code: [email or web address]   Bike x 5'  Side stepping with red RB 2 x 10 yd R/L  Diagonal stepping with red RB 2 x 10 yd F/B  RFE clamshell with red RB 2 x 10 R/L ea.   SL RDL 18# 3 x 12  Crossover step up + high knee 18# KB 3 x 12    SL RDL lateral tension " "on Keisser 6# 2 x 12 R/L  Sled push pull 2 x 10 yd ea.   Sled lateral pull 2 x 10 yd R/L  KB swings 13# all direction 2 x 10 ea.   Reviewed proper strengthening routine in gym including reps, sets, rest, and frequency.     not performed this date:    Matrix seated HS curl SL 40# 3 x 10  Matrix Knee ext SL 55# 4 x 8   Side plank + hip ABD 3 x 45\" R/L   Lateral heel tap 6\" rogue box 3 x 12     Therapeutic Activity       minutes  none    Manual Therapy:        minutes  none    Neuromuscular Re-education:     minutes  none    Modalities:       minutes  none        Assessment    Patient arrived today continuing to report 0/10 pain with performance of HEP, all ADLs/iADLs, hobbies, and regular exercise routine. Marivel has not yet completed return to run program and did not complete HHD/y-balance due to time restraints; however, she has met all other significant goals set by herself and PT. She is independent with HEP was educated on importance of continued strength, endurance, balance, and ROM exercise to continue addressing remaining deficits. At this time, patient is appropriate for discharge with HEP, as they have the tools and knowledge to continuing progressing independently.    Goals:  - LEFS: 75/80 to indicate a significant improvement in overall function.  - MET  - Patient will demo 5/5 LE strength (all joints/planes) to allow for correct gait and stair mechanics - MET  - Patient will demo no limitation of bilateral knee AROM for improved functional ability - MET  - Patient will  demo good neuromuscular control with eccentric loading and will perform step down test from 8 inch box without dynamic knee valgus - MET  - Patient will demo independence with squatting, hinging, and lunging activities in order to safely strength train outside of clinic. - MET  - Patient will perform sport specific plyometric and agility activities with good LE biomechanics to avoid future irritation - MET  - Patient will demo involved " lower extremity strength greater than or equal to 90% of the contralateral lower extremity with HHD - NT  - Patient will pass all directions of Y balance test with >90% performance to contralateral LE. - NT      Plan  DC with HEP. Patient moving to [place].      Ok Orosco, PT

## 2025-05-22 LAB
CYTOLOGY CMNT CVX/VAG CYTO-IMP: NORMAL
LAB AP HPV GENOTYPE QUESTION: YES
LAB AP HPV HR: NORMAL
LAB AP PAP ADDITIONAL TESTS: NORMAL
LABORATORY COMMENT REPORT: NORMAL
PATH REPORT.TOTAL CANCER: NORMAL

## 2025-06-06 ENCOUNTER — PATIENT MESSAGE (OUTPATIENT)
Dept: OBSTETRICS AND GYNECOLOGY | Facility: CLINIC | Age: 25
End: 2025-06-06
Payer: COMMERCIAL

## 2025-06-11 ENCOUNTER — TELEPHONE (OUTPATIENT)
Dept: OBSTETRICS AND GYNECOLOGY | Facility: CLINIC | Age: 25
End: 2025-06-11
Payer: COMMERCIAL

## 2025-06-18 ENCOUNTER — APPOINTMENT (OUTPATIENT)
Dept: OBSTETRICS AND GYNECOLOGY | Facility: CLINIC | Age: 25
End: 2025-06-18
Payer: COMMERCIAL

## 2025-06-18 VITALS — WEIGHT: 134 LBS | SYSTOLIC BLOOD PRESSURE: 126 MMHG | DIASTOLIC BLOOD PRESSURE: 72 MMHG | BODY MASS INDEX: 22.47 KG/M2

## 2025-06-18 DIAGNOSIS — Z32.02 PREGNANCY TEST NEGATIVE: ICD-10-CM

## 2025-06-18 DIAGNOSIS — Z30.430 ENCOUNTER FOR INSERTION OF KYLEENA IUD: ICD-10-CM

## 2025-06-18 DIAGNOSIS — Z30.433 ENCOUNTER FOR REMOVAL AND REINSERTION OF INTRAUTERINE CONTRACEPTIVE DEVICE: Primary | ICD-10-CM

## 2025-06-18 LAB — PREGNANCY TEST URINE, POC: NEGATIVE

## 2025-06-18 PROCEDURE — 58300 INSERT INTRAUTERINE DEVICE: CPT | Performed by: OBSTETRICS & GYNECOLOGY

## 2025-06-18 PROCEDURE — 58301 REMOVE INTRAUTERINE DEVICE: CPT | Performed by: OBSTETRICS & GYNECOLOGY

## 2025-06-18 PROCEDURE — 81025 URINE PREGNANCY TEST: CPT | Performed by: OBSTETRICS & GYNECOLOGY

## 2025-06-18 NOTE — PATIENT INSTRUCTIONS
IUD Insertion Visit:  You were seen in the office today for Kyleena IUD insertion  An ultrasound was done in office today that confirmed correct positioning of your IUD in the uterine cavity  It is common to have vaginal bleeding and cramping following insertion. You can use Tylenol, Ibuprofen, or Naproxen to alleviate cramping. Bleeding can taper over several weeks.  If your IUD was placed during a menstrual period you can consider it immediately effective against pregnancy. If it was not placed during menstrual period it can take 2-3 weeks to be effective, and condoms should be used to protect against pregnancy during that time  IUD's protect against pregnancy but do not protect against STD's. Please remember to use condoms with any new partners and be checked for STD's if you are having vaginal symptoms or have had a new partner within the last year  You may choose to (but do not have to) check for your IUD strings by placing 1-2 clean finger into the vagina. Be careful not to pull/tug the strings as this can displace the IUD. Be careful when using menstrual cups or other similar products, or switch to pads/tampons, as vaginal collection devices can pull on the IUD strings during insertion/removal  If at any time you have symptoms of your IUD being displaced (new heavy bleeding, pelvic cramping, visualization/sensation of IUD strings at the vaginal opening), you should take a pregnancy test, call the office for an appointment, and use condoms to prevent pregnancy until another form of contraception is initiated. If your IUD falls out, rinse it off and bring it to the office in a plastic bag because the company that manufactures the device may be able to replace the device for free.  Make a follow up appointment in the office for 4-6 weeks. Continue to follow up annually for routine gynecologic exams  Call the office if you are having any problems with your IUD or if you have any questions regarding IUD use.  [phone] ([place]) or [phone] ([place])

## 2025-06-18 NOTE — PROGRESS NOTES
Patient ID: Marivel Alvarez is a 24 y.o. female.    IUD Insertion    Performed by: Miriam Gutierrez DO  Authorized by: Miriam Gutierrez DO    Procedure: IUD removal and insertion    Consent obtained by patient, parent, or legal power of  - including discussion of procedure risks and benefits, patient questions answered, and patient education provided: yes    Reason for removal: patient request    Strings visualized: yes    Cervix cleaned with: iodopovidone    Tenaculum applied to cervix: yes    IUD grasped by forceps: yes    Performed with ultrasound guidance: no    IUD removed: yes    Date/Time of Removal:  6/18/2025 3:04 PM  Removed without complications: yes    IUD intact: yes    Pregnancy risk: reasonably certain the patient is not pregnant    Date/Time of Insertion:  6/18/2025 3:04 PM  Speculum placed in vagina: yes    Cervix cleaned and prepped: yes    Tenaculum/Allis/Ring Forceps applied to cervix: yes    Anesthesia used: yes    Local anesthesia:  Paracervical  Local anesthetic:  Lidocaine  Anesthetic strength (%):  1  Volume (mL):  3  Uterus sound depth (cm):  9  IUD inserted without complications: yes    Strings trimmed to (cm):  3  Patient tolerated procedure well: yes    Inserted with ultrasound guidance: no    Transvaginal sono confirmed fundal placement: yes    Estimated blood loss (mL):  5  Intended removal date: 5 years    Miriam Gutierrez DO